# Patient Record
Sex: FEMALE | URBAN - METROPOLITAN AREA
[De-identification: names, ages, dates, MRNs, and addresses within clinical notes are randomized per-mention and may not be internally consistent; named-entity substitution may affect disease eponyms.]

---

## 2019-10-25 ENCOUNTER — OFFICE VISIT (OUTPATIENT)
Dept: URBAN - METROPOLITAN AREA CLINIC 88 | Facility: CLINIC | Age: 29
End: 2019-10-25
Payer: COMMERCIAL

## 2019-10-25 DIAGNOSIS — E11.9 TYPE 2 DIABETES MELLITUS W/O COMPLICATION: Primary | ICD-10-CM

## 2019-10-25 DIAGNOSIS — Q12.0 CONGENITAL CATARACT: ICD-10-CM

## 2019-10-25 DIAGNOSIS — H04.123 DRY EYE SYNDROME OF BILATERAL LACRIMAL GLANDS: ICD-10-CM

## 2019-10-25 PROCEDURE — 99204 OFFICE O/P NEW MOD 45 MIN: CPT | Performed by: OPTOMETRIST

## 2019-10-25 ASSESSMENT — INTRAOCULAR PRESSURE
OS: 21
OD: 21

## 2019-10-25 ASSESSMENT — VISUAL ACUITY
OD: 20/25
OS: 20/20

## 2019-10-25 ASSESSMENT — KERATOMETRY
OD: 45.38
OS: 45.63

## 2019-10-25 NOTE — IMPRESSION/PLAN
Impression: Type 2 diabetes mellitus w/o complication: Q54.6. Plan: Findings were discussed in detail. The patient understands that there are no diabetic related changes taking place in their eyes. Patient understands the importance of monitoring blood glucose and blood pressure levels with their primary care physician to reduce the risk of diabetic related vision loss. Diet and exercise are recommended.

## 2019-10-25 NOTE — IMPRESSION/PLAN
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 Plan: Longstanding, no treatment necessary at this time. Will continue to monitor.